# Patient Record
Sex: FEMALE | Race: WHITE | NOT HISPANIC OR LATINO | Employment: FULL TIME | URBAN - METROPOLITAN AREA
[De-identification: names, ages, dates, MRNs, and addresses within clinical notes are randomized per-mention and may not be internally consistent; named-entity substitution may affect disease eponyms.]

---

## 2017-09-14 ENCOUNTER — HOSPITAL ENCOUNTER (EMERGENCY)
Facility: MEDICAL CENTER | Age: 47
End: 2017-09-15
Attending: EMERGENCY MEDICINE
Payer: MEDICAID

## 2017-09-14 DIAGNOSIS — F43.21 SITUATIONAL DEPRESSION: ICD-10-CM

## 2017-09-14 DIAGNOSIS — T74.21XA SEXUAL ASSAULT OF ADULT, INITIAL ENCOUNTER: ICD-10-CM

## 2017-09-14 PROCEDURE — 99283 EMERGENCY DEPT VISIT LOW MDM: CPT

## 2017-09-15 VITALS
OXYGEN SATURATION: 97 % | WEIGHT: 119.71 LBS | HEIGHT: 64 IN | DIASTOLIC BLOOD PRESSURE: 60 MMHG | BODY MASS INDEX: 20.44 KG/M2 | HEART RATE: 92 BPM | SYSTOLIC BLOOD PRESSURE: 90 MMHG | RESPIRATION RATE: 16 BRPM | TEMPERATURE: 99 F
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
## 2017-09-15 NOTE — DISCHARGE INSTRUCTIONS
Sexual Assault or Rape  Sexual assault is any sexual activity that a person is forced, threatened, or coerced into participating in. It may or may not involve physical contact. You are being sexually abused if you are forced to have sexual contact of any kind. Sexual assault is called rape if penetration has occurred (vaginal, oral, or anal). Many times, sexual assaults are committed by a friend, relative, or associate. Sexual assault and rape are never the victim's fault.   Sexual assault can result in various health problems for the person who was assaulted. Some of these problems include:  · Physical injuries in the genital area or other areas of the body.  · Risk of unwanted pregnancy.  · Risk of sexually transmitted infections (STIs).  · Psychological problems such as anxiety, depression, or posttraumatic stress disorder.  WHAT STEPS SHOULD BE TAKEN AFTER A SEXUAL ASSAULT?  If you have been sexually assaulted, you should take the following steps as soon as possible:  · Go to a safe area as quickly as possible and call your local emergency services (911 in [place] Get away from the area where you have been attacked.    · Do not wash, shower, comb your hair, or clean any part of your body.    · Do not change your clothes.    · Do not remove or touch anything in the area where you were assaulted.    · Go to an emergency room for a complete physical exam. Get the necessary tests to protect yourself from STIs or pregnancy. You may be treated for an STI even if no signs of one are present. Emergency contraceptive medicines are also available to help prevent pregnancy, if this is desired. You may need to be examined by a specially trained health care provider.  · Have the health care provider collect evidence during the exam, even if you are not sure if you will file a report with the police.  · Find out how to file the correct papers with the authorities. This is important for all assaults, even if they were committed  by a family member or friend.  · Find out where you can get additional help and support, such as a local rape crisis center.  · Follow up with your health care provider as directed.    HOW CAN YOU REDUCE THE CHANCES OF SEXUAL ASSAULT?  Take the following steps to help reduce your chances of being sexually assaulted:  · Consider carrying mace or pepper spray for protection against an attacker.    · Consider taking a self-defense course.  · Do not try to fight off an attacker if he or she has a gun or knife.    · Be aware of your surroundings, what is happening around you, and who might be there.    · Be assertive, trust your instincts, and walk with confidence and direction.  · Be careful not to drink too much alcohol or use other intoxicants. These can reduce your ability to fight off an assault.  · Always lock your doors and windows. Be sure to have high-quality locks for your home.    · Do not let people enter your house if you do not know them.    · Get a home security system that has a siren if you are able.    · Protect the keys to your house and car. Do not lend them out. Do not put your name and address on them. If you lose them, get your locks changed.    · Always lock your car and have your key ready to open the door before approaching the car.    · Park in a well-lit and busy area.  · Plan your driving routes so that you travel on well-lit and frequently used streets.   · Keep your car serviced. Always have at least half a tank of gas in it.    · Do not go into isolated areas alone. This includes open garages, empty buildings or offices, or public laundry rooms.    · Do not walk or jog alone, especially when it is dark.    · Never hitchhike.    · If your car breaks down, call the police for help on your cell phone and stay inside the car with your doors locked and windows up.    · If you are being followed, go to a busy area and call for help.    · If you are stopped by a , especially one in  "an unmarked police car, keep your door locked. Do not put your window down all the way. Ask the officer to show you identification first.    · Be aware of \"date rape drugs\" that can be placed in a drink when you are not looking. These drugs can make you unable to fight off an assault.  FOR MORE INFORMATION  · Office on Women's Health, [place] Department of Health and Human Services: www.womenshealth.gov/violence-against-women/types-of-violence/sexual-assault-and-abuse.html  · [place] Sexual Assault Hotline: 0-152-237-HOPE (4673)  · [place] Domestic Violence Hotline: 2-715-430-SAFE (7233) or www.[employer].org     This information is not intended to replace advice given to you by your health care provider. Make sure you discuss any questions you have with your health care provider.     Document Released: 12/15/2001 Document Revised: 08/20/2014 Document Reviewed: 05/21/2014  [employer] Interactive Patient Education ©2016 [employer]    "

## 2018-04-04 ENCOUNTER — NON-PROVIDER VISIT (OUTPATIENT)
Dept: OCCUPATIONAL MEDICINE | Facility: CLINIC | Age: 48
End: 2018-04-04

## 2018-04-04 DIAGNOSIS — Z02.1 PRE-EMPLOYMENT DRUG SCREENING: ICD-10-CM

## 2018-04-04 LAB
AMP AMPHETAMINE: NORMAL
COC COCAINE: NORMAL
INT CON NEG: NORMAL
INT CON POS: NORMAL
MET METHAMPHETAMINES: NORMAL
OPI OPIATES: NORMAL
PCP PHENCYCLIDINE: NORMAL
POC DRUG COMMENT 753798-OCCUPATIONAL HEALTH: NEGATIVE
THC: NORMAL

## 2018-04-04 PROCEDURE — 80305 DRUG TEST PRSMV DIR OPT OBS: CPT | Performed by: PREVENTIVE MEDICINE

## 2023-08-15 ENCOUNTER — HOSPITAL ENCOUNTER (OUTPATIENT)
Facility: MEDICAL CENTER | Age: 53
End: 2023-08-15
Attending: FAMILY MEDICINE
Payer: MEDICAID

## 2023-08-15 ENCOUNTER — OFFICE VISIT (OUTPATIENT)
Dept: URGENT CARE | Facility: PHYSICIAN GROUP | Age: 53
End: 2023-08-15
Payer: MEDICAID

## 2023-08-15 VITALS
WEIGHT: 135 LBS | DIASTOLIC BLOOD PRESSURE: 72 MMHG | TEMPERATURE: 96.6 F | BODY MASS INDEX: 23.05 KG/M2 | HEART RATE: 105 BPM | SYSTOLIC BLOOD PRESSURE: 118 MMHG | RESPIRATION RATE: 16 BRPM | OXYGEN SATURATION: 100 % | HEIGHT: 64 IN

## 2023-08-15 DIAGNOSIS — Z20.2 EXPOSURE TO CHLAMYDIA: ICD-10-CM

## 2023-08-15 DIAGNOSIS — Z20.2 EXPOSURE TO GONORRHEA: ICD-10-CM

## 2023-08-15 LAB
APPEARANCE UR: NORMAL
BILIRUB UR STRIP-MCNC: NEGATIVE MG/DL
COLOR UR AUTO: NORMAL
GLUCOSE UR STRIP.AUTO-MCNC: NEGATIVE MG/DL
KETONES UR STRIP.AUTO-MCNC: NEGATIVE MG/DL
LEUKOCYTE ESTERASE UR QL STRIP.AUTO: NORMAL
NITRITE UR QL STRIP.AUTO: NEGATIVE
PH UR STRIP.AUTO: 6 [PH] (ref 5–8)
POCT INT CON NEG: NEGATIVE
POCT INT CON POS: POSITIVE
POCT URINE PREGNANCY TEST: NEGATIVE
PROT UR QL STRIP: 100 MG/DL
RBC UR QL AUTO: NORMAL
SP GR UR STRIP.AUTO: 1.02
UROBILINOGEN UR STRIP-MCNC: 0.2 MG/DL

## 2023-08-15 PROCEDURE — 3078F DIAST BP <80 MM HG: CPT | Performed by: FAMILY MEDICINE

## 2023-08-15 PROCEDURE — 99203 OFFICE O/P NEW LOW 30 MIN: CPT | Mod: 25 | Performed by: FAMILY MEDICINE

## 2023-08-15 PROCEDURE — 81025 URINE PREGNANCY TEST: CPT | Performed by: FAMILY MEDICINE

## 2023-08-15 PROCEDURE — 87491 CHLMYD TRACH DNA AMP PROBE: CPT

## 2023-08-15 PROCEDURE — 3074F SYST BP LT 130 MM HG: CPT | Performed by: FAMILY MEDICINE

## 2023-08-15 PROCEDURE — 81002 URINALYSIS NONAUTO W/O SCOPE: CPT | Performed by: FAMILY MEDICINE

## 2023-08-15 PROCEDURE — 87591 N.GONORRHOEAE DNA AMP PROB: CPT

## 2023-08-15 RX ORDER — DOXYCYCLINE HYCLATE 100 MG/1
100 CAPSULE ORAL 2 TIMES DAILY
Qty: 14 EACH | Refills: 0 | Status: SHIPPED | OUTPATIENT
Start: 2023-08-15 | End: 2023-08-22

## 2023-08-15 RX ORDER — DOXYCYCLINE HYCLATE 100 MG/1
100 CAPSULE ORAL 2 TIMES DAILY
Qty: 14 EACH | Refills: 0 | Status: SHIPPED | OUTPATIENT
Start: 2023-08-15 | End: 2023-08-15 | Stop reason: SDUPTHER

## 2023-08-15 ASSESSMENT — ENCOUNTER SYMPTOMS
MYALGIAS: 0
CHILLS: 0
DIZZINESS: 0
NAUSEA: 0
COUGH: 0
VOMITING: 0
SORE THROAT: 0
FEVER: 0
SHORTNESS OF BREATH: 0

## 2023-08-17 LAB
C TRACH DNA SPEC QL NAA+PROBE: NEGATIVE
N GONORRHOEA DNA SPEC QL NAA+PROBE: POSITIVE
SPECIMEN SOURCE: ABNORMAL